# Patient Record
Sex: FEMALE | Race: BLACK OR AFRICAN AMERICAN | NOT HISPANIC OR LATINO | Employment: FULL TIME | URBAN - METROPOLITAN AREA
[De-identification: names, ages, dates, MRNs, and addresses within clinical notes are randomized per-mention and may not be internally consistent; named-entity substitution may affect disease eponyms.]

---

## 2020-02-23 ENCOUNTER — HOSPITAL ENCOUNTER (EMERGENCY)
Facility: HOSPITAL | Age: 25
Discharge: HOME | End: 2020-02-23
Attending: EMERGENCY MEDICINE
Payer: MEDICAID

## 2020-02-23 VITALS
WEIGHT: 160 LBS | OXYGEN SATURATION: 97 % | HEIGHT: 61 IN | TEMPERATURE: 101 F | DIASTOLIC BLOOD PRESSURE: 78 MMHG | HEART RATE: 115 BPM | BODY MASS INDEX: 30.21 KG/M2 | SYSTOLIC BLOOD PRESSURE: 133 MMHG | RESPIRATION RATE: 18 BRPM

## 2020-02-23 DIAGNOSIS — R50.9 FEVER, UNSPECIFIED FEVER CAUSE: ICD-10-CM

## 2020-02-23 DIAGNOSIS — J11.1 INFLUENZA-LIKE ILLNESS: Primary | ICD-10-CM

## 2020-02-23 PROCEDURE — 99284 EMERGENCY DEPT VISIT MOD MDM: CPT | Mod: ER

## 2020-02-23 PROCEDURE — 25000003 PHARM REV CODE 250: Mod: ER | Performed by: EMERGENCY MEDICINE

## 2020-02-23 RX ORDER — CETIRIZINE HYDROCHLORIDE 10 MG/1
10 TABLET ORAL DAILY
Qty: 30 TABLET | Refills: 0 | COMMUNITY
Start: 2020-02-23 | End: 2020-08-30 | Stop reason: CLARIF

## 2020-02-23 RX ORDER — FLUTICASONE PROPIONATE 50 MCG
1 SPRAY, SUSPENSION (ML) NASAL 2 TIMES DAILY PRN
Qty: 15 G | Refills: 0 | Status: SHIPPED | OUTPATIENT
Start: 2020-02-23 | End: 2020-08-30 | Stop reason: CLARIF

## 2020-02-23 RX ORDER — BENZONATATE 100 MG/1
200 CAPSULE ORAL 3 TIMES DAILY PRN
Qty: 20 CAPSULE | Refills: 0 | Status: SHIPPED | OUTPATIENT
Start: 2020-02-23 | End: 2020-03-04

## 2020-02-23 RX ORDER — LOSARTAN POTASSIUM 25 MG/1
25 TABLET ORAL DAILY
COMMUNITY

## 2020-02-23 RX ORDER — ACETAMINOPHEN 500 MG
1000 TABLET ORAL
Status: COMPLETED | OUTPATIENT
Start: 2020-02-23 | End: 2020-02-23

## 2020-02-23 RX ADMIN — ACETAMINOPHEN 1000 MG: 500 TABLET ORAL at 05:02

## 2020-02-23 NOTE — ED PROVIDER NOTES
Encounter Date: 2/23/2020       History     Chief Complaint   Patient presents with    Fever     Patient reports fever, cough, congestion and dizziness x3 days    Cough    Nasal Congestion    Dizziness     Patient currently presents with concerns regarding flulike symptoms.  Symptoms include cough, nasal congestion, fever, and myalgias.  Symptoms reportedly began 3 days ago.  Patient denies vomiting. Patient denies diarrhea.  There has been no relief with over-the-counter medications at home.  Patient denies recent ill contacts          Review of patient's allergies indicates:   Allergen Reactions    Amoxicillin Hives     Past Medical History:   Diagnosis Date    Hypertension      History reviewed. No pertinent surgical history.  History reviewed. No pertinent family history.  Social History     Tobacco Use    Smoking status: Never Smoker    Smokeless tobacco: Never Used   Substance Use Topics    Alcohol use: Never     Frequency: Never    Drug use: Never     Review of Systems   Constitutional: Positive for appetite change and fatigue. Negative for chills and fever.   HENT: Positive for congestion, postnasal drip and rhinorrhea. Negative for sore throat.    Respiratory: Positive for cough. Negative for chest tightness, shortness of breath and wheezing.    Cardiovascular: Negative for chest pain, palpitations and leg swelling.   Gastrointestinal: Negative for abdominal pain, constipation, diarrhea, nausea and vomiting.   Genitourinary: Negative for dysuria, frequency, urgency, vaginal bleeding and vaginal discharge.   Musculoskeletal: Positive for myalgias. Negative for back pain.   Skin: Negative for color change and rash.   Allergic/Immunologic: Negative for immunocompromised state.   Neurological: Negative for dizziness, weakness and numbness.   Hematological: Negative for adenopathy. Does not bruise/bleed easily.   All other systems reviewed and are negative.      Physical Exam     Initial Vitals  [02/23/20 0529]   BP Pulse Resp Temp SpO2   133/78 (!) 115 18 (!) 101.1 °F (38.4 °C) 97 %      MAP       --         Physical Exam    Nursing note and vitals reviewed.  Constitutional: She appears well-developed and well-nourished. She is not diaphoretic. No distress.   HENT:   Head: Normocephalic and atraumatic.   Right Ear: External ear normal.   Left Ear: External ear normal.   Nose: Rhinorrhea present.   Mouth/Throat: Oropharynx is clear and moist.   Eyes: Conjunctivae and EOM are normal. Pupils are equal, round, and reactive to light. No scleral icterus.   Neck: Neck supple. No tracheal deviation present. No JVD present.   Cardiovascular: Normal rate, regular rhythm, normal heart sounds and intact distal pulses. Exam reveals no gallop and no friction rub.    No murmur heard.  Pulmonary/Chest: Breath sounds normal. No respiratory distress. She has no wheezes. She has no rhonchi. She has no rales.   Abdominal: Soft. Bowel sounds are normal. She exhibits no distension. There is no tenderness.   Musculoskeletal: Normal range of motion. She exhibits no edema.   Neurological: She is alert and oriented to person, place, and time. She has normal strength. No cranial nerve deficit or sensory deficit.   Skin: Skin is warm and dry. No rash noted.   Psychiatric: She has a normal mood and affect. Her behavior is normal.         ED Course   Procedures  Labs Reviewed - No data to display       Imaging Results    None          Medical Decision Making:   ED Management:  All findings were reviewed with the patient/family in detail.  I see no indication of an emergent process beyond that addressed during our encounter but have duly counseled the patient/family regarding the need for prompt follow-up as well as the indications that should prompt immediate return to the emergency room should new or worrisome developments occur.  The patient has additionally been provided with printed information regarding diagnosis as well as  instructions regarding follow up and any medications intended to manage the patient's aforementioned conditions.  The patient/family communicates understanding of all this information and all remaining questions and concerns were addressed at this time.                                       Clinical Impression:       ICD-10-CM ICD-9-CM   1. Influenza-like illness R69 799.89   2. Fever, unspecified fever cause R50.9 780.60                             Deep Aviles MD  02/23/20 0546

## 2020-08-30 ENCOUNTER — HOSPITAL ENCOUNTER (EMERGENCY)
Facility: HOSPITAL | Age: 25
Discharge: HOME | End: 2020-08-30
Attending: EMERGENCY MEDICINE
Payer: MEDICAID

## 2020-08-30 VITALS
RESPIRATION RATE: 28 BRPM | HEIGHT: 62 IN | TEMPERATURE: 99 F | HEART RATE: 89 BPM | WEIGHT: 165 LBS | OXYGEN SATURATION: 100 % | BODY MASS INDEX: 30.36 KG/M2 | DIASTOLIC BLOOD PRESSURE: 77 MMHG | SYSTOLIC BLOOD PRESSURE: 130 MMHG

## 2020-08-30 DIAGNOSIS — R53.83 FATIGUE: ICD-10-CM

## 2020-08-30 DIAGNOSIS — R11.0 NAUSEA: Primary | ICD-10-CM

## 2020-08-30 LAB
ALBUMIN SERPL BCP-MCNC: 4.3 G/DL (ref 3.5–5.2)
ALP SERPL-CCNC: 84 U/L (ref 38–126)
ALT SERPL W/O P-5'-P-CCNC: 17 U/L (ref 10–44)
AMPHET+METHAMPHET UR QL: NEGATIVE
ANION GAP SERPL CALC-SCNC: 8 MMOL/L (ref 8–16)
AST SERPL-CCNC: 31 U/L (ref 15–46)
B-HCG UR QL: NEGATIVE
BACTERIA #/AREA URNS AUTO: ABNORMAL /HPF
BARBITURATES UR QL SCN>200 NG/ML: NEGATIVE
BASOPHILS # BLD AUTO: 0.03 K/UL (ref 0–0.2)
BASOPHILS NFR BLD: 0.4 % (ref 0–1.9)
BENZODIAZ UR QL SCN>200 NG/ML: NEGATIVE
BILIRUB SERPL-MCNC: 0.6 MG/DL (ref 0.1–1)
BILIRUB UR QL STRIP: NEGATIVE
BUN SERPL-MCNC: 13 MG/DL (ref 7–17)
BZE UR QL SCN: NEGATIVE
CALCIUM SERPL-MCNC: 8.8 MG/DL (ref 8.7–10.5)
CANNABINOIDS UR QL SCN: NEGATIVE
CHLORIDE SERPL-SCNC: 104 MMOL/L (ref 95–110)
CLARITY UR REFRACT.AUTO: CLEAR
CO2 SERPL-SCNC: 28 MMOL/L (ref 23–29)
COLOR UR AUTO: YELLOW
CREAT SERPL-MCNC: 0.91 MG/DL (ref 0.5–1.4)
CREAT UR-MCNC: 69.5 MG/DL (ref 15–325)
DIFFERENTIAL METHOD: NORMAL
EOSINOPHIL # BLD AUTO: 0.1 K/UL (ref 0–0.5)
EOSINOPHIL NFR BLD: 1.3 % (ref 0–8)
ERYTHROCYTE [DISTWIDTH] IN BLOOD BY AUTOMATED COUNT: 12.3 % (ref 11.5–14.5)
EST. GFR  (AFRICAN AMERICAN): >60 ML/MIN/1.73 M^2
EST. GFR  (NON AFRICAN AMERICAN): >60 ML/MIN/1.73 M^2
GLUCOSE SERPL-MCNC: 97 MG/DL (ref 70–110)
GLUCOSE UR QL STRIP: NEGATIVE
HCT VFR BLD AUTO: 39.9 % (ref 37–48.5)
HGB BLD-MCNC: 13 G/DL (ref 12–16)
HGB UR QL STRIP: NEGATIVE
IMM GRANULOCYTES # BLD AUTO: 0.02 K/UL (ref 0–0.04)
IMM GRANULOCYTES NFR BLD AUTO: 0.3 % (ref 0–0.5)
KETONES UR QL STRIP: NEGATIVE
LEUKOCYTE ESTERASE UR QL STRIP: ABNORMAL
LYMPHOCYTES # BLD AUTO: 2.7 K/UL (ref 1–4.8)
LYMPHOCYTES NFR BLD: 38.2 % (ref 18–48)
MCH RBC QN AUTO: 30.4 PG (ref 27–31)
MCHC RBC AUTO-ENTMCNC: 32.6 G/DL (ref 32–36)
MCV RBC AUTO: 93 FL (ref 82–98)
METHADONE UR QL SCN>300 NG/ML: NEGATIVE
MICROSCOPIC COMMENT: ABNORMAL
MONOCYTES # BLD AUTO: 0.6 K/UL (ref 0.3–1)
MONOCYTES NFR BLD: 8.8 % (ref 4–15)
NEUTROPHILS # BLD AUTO: 3.7 K/UL (ref 1.8–7.7)
NEUTROPHILS NFR BLD: 51 % (ref 38–73)
NITRITE UR QL STRIP: NEGATIVE
NRBC BLD-RTO: 0 /100 WBC
NT-PROBNP: 32 PG/ML (ref 5–450)
OPIATES UR QL SCN: NEGATIVE
PCP UR QL SCN>25 NG/ML: NEGATIVE
PH UR STRIP: 6 [PH] (ref 5–8)
PLATELET # BLD AUTO: 305 K/UL (ref 150–350)
PMV BLD AUTO: 10.5 FL (ref 9.2–12.9)
POTASSIUM SERPL-SCNC: 3.8 MMOL/L (ref 3.5–5.1)
PROT SERPL-MCNC: 7.6 G/DL (ref 6–8.4)
PROT UR QL STRIP: NEGATIVE
RBC # BLD AUTO: 4.28 M/UL (ref 4–5.4)
RBC #/AREA URNS AUTO: 1 /HPF (ref 0–4)
SODIUM SERPL-SCNC: 140 MMOL/L (ref 136–145)
SP GR UR STRIP: 1.01 (ref 1–1.03)
SQUAMOUS #/AREA URNS AUTO: ABNORMAL /HPF
TOXICOLOGY INFORMATION: NORMAL
TROPONIN I SERPL DL<=0.01 NG/ML-MCNC: <0.012 NG/ML (ref 0.01–0.03)
TSH SERPL DL<=0.005 MIU/L-ACNC: 2.63 UIU/ML (ref 0.4–4)
URN SPEC COLLECT METH UR: ABNORMAL
UROBILINOGEN UR STRIP-ACNC: NEGATIVE EU/DL
WBC # BLD AUTO: 7.15 K/UL (ref 3.9–12.7)
WBC #/AREA URNS AUTO: 8 /HPF (ref 0–5)

## 2020-08-30 PROCEDURE — 36000 PLACE NEEDLE IN VEIN: CPT | Mod: ER

## 2020-08-30 PROCEDURE — 93005 ELECTROCARDIOGRAM TRACING: CPT | Mod: ER

## 2020-08-30 PROCEDURE — 81000 URINALYSIS NONAUTO W/SCOPE: CPT | Mod: ER,59

## 2020-08-30 PROCEDURE — U0003 INFECTIOUS AGENT DETECTION BY NUCLEIC ACID (DNA OR RNA); SEVERE ACUTE RESPIRATORY SYNDROME CORONAVIRUS 2 (SARS-COV-2) (CORONAVIRUS DISEASE [COVID-19]), AMPLIFIED PROBE TECHNIQUE, MAKING USE OF HIGH THROUGHPUT TECHNOLOGIES AS DESCRIBED BY CMS-2020-01-R: HCPCS | Mod: ER

## 2020-08-30 PROCEDURE — 93010 ELECTROCARDIOGRAM REPORT: CPT | Mod: ,,, | Performed by: INTERNAL MEDICINE

## 2020-08-30 PROCEDURE — 83880 ASSAY OF NATRIURETIC PEPTIDE: CPT | Mod: ER

## 2020-08-30 PROCEDURE — 99284 EMERGENCY DEPT VISIT MOD MDM: CPT | Mod: 25,ER

## 2020-08-30 PROCEDURE — 84484 ASSAY OF TROPONIN QUANT: CPT | Mod: ER

## 2020-08-30 PROCEDURE — 80053 COMPREHEN METABOLIC PANEL: CPT | Mod: ER

## 2020-08-30 PROCEDURE — 84443 ASSAY THYROID STIM HORMONE: CPT | Mod: ER

## 2020-08-30 PROCEDURE — 25000003 PHARM REV CODE 250: Mod: ER | Performed by: EMERGENCY MEDICINE

## 2020-08-30 PROCEDURE — 93010 EKG 12-LEAD: ICD-10-PCS | Mod: ,,, | Performed by: INTERNAL MEDICINE

## 2020-08-30 PROCEDURE — 80307 DRUG TEST PRSMV CHEM ANLYZR: CPT | Mod: ER

## 2020-08-30 PROCEDURE — 81025 URINE PREGNANCY TEST: CPT | Mod: ER

## 2020-08-30 PROCEDURE — 85025 COMPLETE CBC W/AUTO DIFF WBC: CPT | Mod: ER

## 2020-08-30 RX ORDER — SULFAMETHOXAZOLE AND TRIMETHOPRIM 800; 160 MG/1; MG/1
1 TABLET ORAL 2 TIMES DAILY
Qty: 6 TABLET | Refills: 0 | Status: SHIPPED | OUTPATIENT
Start: 2020-08-30 | End: 2020-09-02

## 2020-08-30 RX ORDER — ONDANSETRON 4 MG/1
4 TABLET, ORALLY DISINTEGRATING ORAL EVERY 8 HOURS PRN
Qty: 9 TABLET | Refills: 0 | Status: SHIPPED | OUTPATIENT
Start: 2020-08-30 | End: 2020-09-02

## 2020-08-30 RX ORDER — ONDANSETRON 4 MG/1
4 TABLET, ORALLY DISINTEGRATING ORAL
Status: COMPLETED | OUTPATIENT
Start: 2020-08-30 | End: 2020-08-30

## 2020-08-30 RX ADMIN — ONDANSETRON 4 MG: 4 TABLET, ORALLY DISINTEGRATING ORAL at 08:08

## 2020-08-30 NOTE — ED PROVIDER NOTES
Encounter Date: 8/30/2020       History     Chief Complaint   Patient presents with    Dizziness     Pt with c/o weakness, dizziness and nausea x3 days.     Weakness    Nausea     Pt is a 26 yo AAF with pmhx of HTN (for which she takes Losartan daily) who presents to the ED with the complaint of lightheadedness (she denies true dizziness or room spinning when questioned), fatigue and nausea x 3 days. Pt denies any sick contacts, changes in appetite, new medication, illicit drug use, blood in stool, chest pain, shortness of breath, fever, chills,  symptoms, abdominal pain, night sweats, unintentional weight loss, significant life events or stressors.         Review of patient's allergies indicates:   Allergen Reactions    Amoxicillin Hives     Past Medical History:   Diagnosis Date    Hypertension      History reviewed. No pertinent surgical history.  History reviewed. No pertinent family history.  Social History     Tobacco Use    Smoking status: Never Smoker    Smokeless tobacco: Never Used   Substance Use Topics    Alcohol use: Never     Frequency: Never    Drug use: Never     Review of Systems   Constitutional: Positive for fatigue. Negative for activity change, appetite change, chills, diaphoresis, fever and unexpected weight change.   HENT: Negative for congestion.    Eyes: Negative for photophobia and visual disturbance.   Respiratory: Negative for chest tightness and shortness of breath.    Cardiovascular: Negative for chest pain, palpitations and leg swelling.   Gastrointestinal: Positive for nausea. Negative for abdominal pain, blood in stool, constipation, diarrhea and vomiting.   Endocrine: Negative for polyphagia and polyuria.   Genitourinary: Negative for dysuria, frequency, menstrual problem, pelvic pain, urgency, vaginal bleeding, vaginal discharge and vaginal pain.   Musculoskeletal: Negative for back pain, joint swelling, myalgias, neck pain and neck stiffness.   Allergic/Immunologic:  Negative for immunocompromised state.   Neurological: Positive for light-headedness. Negative for dizziness, tremors, syncope, facial asymmetry, speech difficulty and headaches.   Psychiatric/Behavioral: Negative for agitation, confusion and decreased concentration.       Physical Exam     Initial Vitals [08/30/20 0708]   BP Pulse Resp Temp SpO2   (!) 158/93 89 18 99 °F (37.2 °C) 100 %      MAP       --         Physical Exam    Nursing note and vitals reviewed.  Constitutional: She appears well-developed and well-nourished.   Well-appearing  No acute distress   HENT:   Head: Normocephalic and atraumatic.   Right Ear: External ear normal.   Left Ear: External ear normal.   Mouth/Throat: No oropharyngeal exudate.   Eyes: EOM are normal. Pupils are equal, round, and reactive to light.   Neck: Normal range of motion. Neck supple. No thyromegaly present. No JVD present.   Cardiovascular: Normal rate, regular rhythm, normal heart sounds and intact distal pulses.   No murmur heard.  Pulmonary/Chest: Breath sounds normal.   Lungs clear to auscultation bilaterally   Abdominal: Soft. Bowel sounds are normal.   Abdomen soft, nontender nondistended; normal bowel sounds all 4 quadrants   Musculoskeletal: Normal range of motion.   Lymphadenopathy:     She has no cervical adenopathy.   Neurological: She is alert and oriented to person, place, and time.   AAOx4  Strength 5/5   Sensation grossly in tact  No neurological deficits  MAEW  GCS 15   Skin: Skin is warm and dry. Capillary refill takes less than 2 seconds. No rash and no abscess noted.   Psychiatric: She has a normal mood and affect.         ED Course   Procedures  Labs Reviewed   URINALYSIS, REFLEX TO URINE CULTURE - Abnormal; Notable for the following components:       Result Value    Leukocytes, UA 2+ (*)     All other components within normal limits    Narrative:     Specimen Source->Urine   URINALYSIS MICROSCOPIC - Abnormal; Notable for the following components:     WBC, UA 8 (*)     Bacteria Moderate (*)     All other components within normal limits    Narrative:     Specimen Source->Urine   TSH   PREGNANCY TEST, URINE RAPID    Narrative:     Specimen Source->Urine   CBC W/ AUTO DIFFERENTIAL   COMPREHENSIVE METABOLIC PANEL   TROPONIN I   NT-PRO NATRIURETIC PEPTIDE   DRUG SCREEN PANEL, URINE EMERGENCY    Narrative:     Specimen Source->Urine   SARS-COV-2 (COVID-19) QUALITATIVE PCR     EKG Readings: (Independently Interpreted)   Initial Reading: No STEMI. Rhythm: Normal Sinus Rhythm. Heart Rate: 75. Ectopy: No Ectopy. Conduction: Normal. ST Segments: Normal ST Segments. Axis: Normal.       Imaging Results    None          Medical Decision Making:   Clinical Tests:   Lab Tests: Ordered and Reviewed  ED Management:  - EKG NSR; no arrhythmia; no findings to suggest ischemia; no STEMI  - CBC w/diff WNL; H/H stable; no significant leukocytosis  - CMP WNL; no significant electrolyte abnormality; renal function WNL   - Troponin WNL   - BNP WNL   - TSH WNL   - UDS WNL  - UA with 2+ leuks; nitrite neg; no ketones; no glucose  - UPT negative   - Routine COVID 19 swab ordered, pending   - VSS throughout ED stay  - Pt stable for discharge  - No further intervention is indicated at this time after having taken into account the patient's history, physical exam findings, and empirical and objective data obtained during the patient's emergency department workup.   - The patient is at low risk for an emergent medical condition at this time, and I am of the belief that that it is safe to discharge the patient from the emergency department.   - The patient is instructed to follow up as outpatient as indicated on the discharge paperwork.    - I have discussed the specifics of the workup with the patient and the patient has verbalized understanding of the details of the workup, the diagnosis, the treatment plan, and the need for outpatient follow-up.    - Although the patient has no emergent  etiology today this does not preclude the development of an emergent condition so, in addition, I have advised the patient that they can return to the ED and/or activate EMS at any time with worsening of their symptoms, change of their symptoms, or with any other medical complaint.    - The patient remained comfortable and stable during their visit in the ED.    - Discharge and follow-up instructions discussed with the patient who expressed understanding and willingness to comply with my recommendations.  - Results of all emergency department tests  discussed thoroughly with patient; all patient questions answered; pt in agreement with plan  - Pt instructed to follow up with PCP in 2-3 days for recheck of today's complaints  - Pt given strict emergency department return precautions for any new or worsening of symptoms  - Pt discharged from the emergency department in stable condition, in no acute distress                     ED Course as of Aug 30 0937   Sun Aug 30, 2020   0904 Leukocytes, UA(!): 2+ [LC]      ED Course User Index  [LC] Wade Arriaga MD                Clinical Impression:       ICD-10-CM ICD-9-CM   1. Nausea  R11.0 787.02   2. Fatigue  R53.83 780.79             ED Disposition Condition    Discharge Stable        ED Prescriptions     Medication Sig Dispense Start Date End Date Auth. Provider    sulfamethoxazole-trimethoprim 800-160mg (BACTRIM DS) 800-160 mg Tab Take 1 tablet by mouth 2 (two) times daily. for 3 days 6 tablet 8/30/2020 9/2/2020 Wade Arriaga MD    ondansetron (ZOFRAN-ODT) 4 MG TbDL Take 1 tablet (4 mg total) by mouth every 8 (eight) hours as needed (nausea). 9 tablet 8/30/2020 9/2/2020 Wade Arriaga MD        Follow-up Information     Follow up With Specialties Details Why Contact Info    [facility]  If symptoms worsen [address] [facility] [facility] [address] [phone]                                     aWde HOLDEN  MD Bart  08/30/20 0937

## 2020-08-31 LAB — SARS-COV-2 RNA RESP QL NAA+PROBE: NOT DETECTED

## 2021-12-21 ENCOUNTER — HOSPITAL ENCOUNTER (EMERGENCY)
Facility: HOSPITAL | Age: 26
Discharge: HOME | End: 2021-12-21
Attending: EMERGENCY MEDICINE
Payer: MEDICAID

## 2021-12-21 VITALS
TEMPERATURE: 98 F | BODY MASS INDEX: 30.18 KG/M2 | HEART RATE: 109 BPM | WEIGHT: 165 LBS | DIASTOLIC BLOOD PRESSURE: 78 MMHG | RESPIRATION RATE: 18 BRPM | OXYGEN SATURATION: 100 % | SYSTOLIC BLOOD PRESSURE: 136 MMHG

## 2021-12-21 DIAGNOSIS — Z20.822 CLOSE EXPOSURE TO COVID-19 VIRUS: Primary | ICD-10-CM

## 2021-12-21 LAB — SARS-COV-2 RDRP RESP QL NAA+PROBE: NEGATIVE

## 2021-12-21 PROCEDURE — U0002 COVID-19 LAB TEST NON-CDC: HCPCS | Mod: ER | Performed by: EMERGENCY MEDICINE

## 2021-12-21 PROCEDURE — 99282 EMERGENCY DEPT VISIT SF MDM: CPT | Mod: ER

## 2021-12-25 ENCOUNTER — HOSPITAL ENCOUNTER (EMERGENCY)
Facility: HOSPITAL | Age: 26
Discharge: HOME | End: 2021-12-25
Attending: EMERGENCY MEDICINE
Payer: MEDICAID

## 2021-12-25 VITALS
DIASTOLIC BLOOD PRESSURE: 93 MMHG | RESPIRATION RATE: 17 BRPM | SYSTOLIC BLOOD PRESSURE: 144 MMHG | HEART RATE: 113 BPM | OXYGEN SATURATION: 100 % | HEIGHT: 62 IN | TEMPERATURE: 99 F | BODY MASS INDEX: 30.36 KG/M2 | WEIGHT: 165 LBS

## 2021-12-25 DIAGNOSIS — U07.1 COVID-19: Primary | ICD-10-CM

## 2021-12-25 LAB
B-HCG UR QL: NEGATIVE
INFLUENZA A, MOLECULAR: NEGATIVE
INFLUENZA B, MOLECULAR: NEGATIVE
SARS-COV-2 RDRP RESP QL NAA+PROBE: POSITIVE
SPECIMEN SOURCE: NORMAL

## 2021-12-25 PROCEDURE — 99283 EMERGENCY DEPT VISIT LOW MDM: CPT | Mod: 25,ER

## 2021-12-25 PROCEDURE — 87502 INFLUENZA DNA AMP PROBE: CPT | Mod: ER | Performed by: EMERGENCY MEDICINE

## 2021-12-25 PROCEDURE — 81025 URINE PREGNANCY TEST: CPT | Mod: ER | Performed by: EMERGENCY MEDICINE

## 2021-12-25 PROCEDURE — U0002 COVID-19 LAB TEST NON-CDC: HCPCS | Mod: ER | Performed by: EMERGENCY MEDICINE

## 2021-12-25 RX ORDER — KETOROLAC TROMETHAMINE 30 MG/ML
15 INJECTION, SOLUTION INTRAMUSCULAR; INTRAVENOUS
Status: DISCONTINUED | OUTPATIENT
Start: 2021-12-25 | End: 2021-12-25 | Stop reason: HOSPADM

## 2021-12-25 RX ORDER — DICLOFENAC SODIUM 75 MG/1
75 TABLET, DELAYED RELEASE ORAL 2 TIMES DAILY
Qty: 60 TABLET | Refills: 0 | Status: SHIPPED | OUTPATIENT
Start: 2021-12-25

## 2021-12-25 NOTE — Clinical Note
"Halle"Emilia Melendez was seen and treated in our emergency department on 12/25/2021.     COVID-19 is present in our communities across the state. There is limited testing for COVID at this time, so not all patients can be tested. In this situation, your employee meets the following criteria:    Halle Melendez has met the criteria for COVID-19 testing and has a POSITIVE result. She can return to work once they are asymptomatic for 72 hours without the use of fever reducing medications AND at least ten days from the first positive result.     If you have any questions or concerns, or if I can be of further assistance, please do not hesitate to contact me.    Sincerely,              RN"

## 2021-12-25 NOTE — Clinical Note
"Halle"Emilia Melendez was seen and treated in our emergency department on 12/25/2021.     COVID-19 is present in our communities across the state. There is limited testing for COVID at this time, so not all patients can be tested. In this situation, your employee meets the following criteria:    Halle Melendez has met the criteria for COVID-19 testing based upon symptoms, travel, and/or potential exposure. The test has been completed and is pending results at this time. During this time the employee is not able to work and should be quarantined per the Centers for Disease Control timelines.     If you have any questions or concerns, or if I can be of further assistance, please do not hesitate to contact me.    Sincerely,              RN"

## 2021-12-25 NOTE — ED PROVIDER NOTES
Encounter Date: 12/25/2021       History     Chief Complaint   Patient presents with    sore throat and headache     Pt states she woke up today with headache, sore throat,body aches and cough. Unknown fever     HPI   26 y.o.    Today with ha, ST, myalgias, cough    Vaccinated w/o booster    During covid pandemic    Review of patient's allergies indicates:   Allergen Reactions    Amoxicillin Hives    Nifedipine Palpitations     Past Medical History:   Diagnosis Date    Hypertension      No past surgical history on file.  No family history on file.  Social History     Tobacco Use    Smoking status: Never Smoker    Smokeless tobacco: Never Used   Substance Use Topics    Alcohol use: Never    Drug use: Never     Review of Systems  All systems were reviewed/examined and were negative except as noted in the HPI.    Physical Exam     Initial Vitals [12/25/21 1046]   BP Pulse Resp Temp SpO2   (!) 144/93 (!) 113 17 98.8 °F (37.1 °C) 100 %      MAP       --         Physical Exam    Limited exam due to known COVID positivity    General: the patient is awake, alert, and in no apparent distress. nontoxic  Head: normocephalic and atraumatic, sclera are clear  Neck: supple without meningismus  Chest:  no respiratory distress  Heart: regular rate and rhythm borderline tachy  Psych conversant  Neuro: awake, alert, moving all extremities      ED Course   Procedures  Labs Reviewed   SARS-COV-2 RNA AMPLIFICATION, QUAL - Abnormal; Notable for the following components:       Result Value    SARS-CoV-2 RNA, Amplification, Qual Positive (*)     All other components within normal limits    Narrative:     Is the patient symptomatic?->Yes  COVID-19 result(s) called and verbal readback obtained from Coreen Walsh RN by CXR 12/25/2021 11:29   INFLUENZA A & B BY MOLECULAR   PREGNANCY TEST, URINE RAPID    Narrative:     Specimen Source->Urine          Imaging Results    None          Medications   ketorolac injection 15 mg (15 mg  Intramuscular Not Given 12/25/21 4957)          Medical Decision Making:    This is an emergent evaluation of a patient presenting to the ED.  Nursing notes were reviewed.  covid psoitive  I personally reviewed and interpreted the laboratory results.    I decided to obtain and review old medical records, which showed: ED visits    Evaluation for Emergency Medical Condition  The patient received a medical screening exam and within a reasonable degree of clinical confidence an emergency medical condition has not been identified.  The patient is instructed on proper follow up and return precautions to the ED.      Andre Nunez MD, JERMAINE                   Clinical Impression:   Final diagnoses:  [U07.1] COVID-19 (Primary)          ED Disposition Condition    Discharge Stable        ED Prescriptions     Medication Sig Dispense Start Date End Date Auth. Provider    diclofenac (VOLTAREN) 75 MG EC tablet Take 1 tablet (75 mg total) by mouth 2 (two) times daily. 60 tablet 12/25/2021  Irineo Nunez MD        Follow-up Information    None       Discharged to home in stable condition, return to ED warnings given, follow up and patient care instructions given.      Andre Nunez MD, JERMAINE, CPE, FACEP  Department of Emergency Medicine       Irineo Nunez MD  12/25/21 9739

## 2023-04-10 ENCOUNTER — HOSPITAL ENCOUNTER (EMERGENCY)
Facility: HOSPITAL | Age: 28
Discharge: HOME | End: 2023-04-10
Attending: EMERGENCY MEDICINE
Payer: MEDICAID

## 2023-04-10 VITALS
SYSTOLIC BLOOD PRESSURE: 146 MMHG | OXYGEN SATURATION: 100 % | WEIGHT: 154 LBS | DIASTOLIC BLOOD PRESSURE: 87 MMHG | BODY MASS INDEX: 28.17 KG/M2 | RESPIRATION RATE: 17 BRPM | TEMPERATURE: 98 F | HEART RATE: 79 BPM

## 2023-04-10 DIAGNOSIS — K64.4 EXTERNAL HEMORRHOID: Primary | ICD-10-CM

## 2023-04-10 LAB
B-HCG UR QL: NEGATIVE
CTP QC/QA: YES

## 2023-04-10 PROCEDURE — 81025 URINE PREGNANCY TEST: CPT | Mod: ER | Performed by: EMERGENCY MEDICINE

## 2023-04-10 PROCEDURE — 99283 EMERGENCY DEPT VISIT LOW MDM: CPT | Mod: ER

## 2023-04-10 RX ORDER — KETOROLAC TROMETHAMINE 30 MG/ML
15 INJECTION, SOLUTION INTRAMUSCULAR; INTRAVENOUS
Status: DISCONTINUED | OUTPATIENT
Start: 2023-04-10 | End: 2023-04-10 | Stop reason: HOSPADM

## 2023-04-10 RX ORDER — HYDROCORTISONE 25 MG/G
CREAM TOPICAL 2 TIMES DAILY
Qty: 20 G | Refills: 0 | Status: SHIPPED | OUTPATIENT
Start: 2023-04-10

## 2023-04-10 RX ORDER — DICLOFENAC SODIUM 75 MG/1
75 TABLET, DELAYED RELEASE ORAL 2 TIMES DAILY
Qty: 60 TABLET | Refills: 0 | Status: SHIPPED | OUTPATIENT
Start: 2023-04-10

## 2023-04-10 NOTE — ED PROVIDER NOTES
Encounter Date: 4/10/2023       History     Chief Complaint   Patient presents with    Hemorrhoids     Reports hemorrhoids x 3 days. States started bleeding today. Reports using witch hazel for pain     HPI  27 y.o.    Co hemorrhoids with some mild bleeding today  Sx x few days  Last had with child  Tried OTC meds    Review of patient's allergies indicates:   Allergen Reactions    Amoxicillin Hives    Nifedipine Palpitations     Past Medical History:   Diagnosis Date    Hypertension      History reviewed. No pertinent surgical history.  History reviewed. No pertinent family history.  Social History     Tobacco Use    Smoking status: Never    Smokeless tobacco: Never   Substance Use Topics    Alcohol use: Never    Drug use: Never     Review of Systems  All systems were reviewed/examined and were negative except as noted in the HPI.    Physical Exam     Initial Vitals [04/10/23 1728]   BP Pulse Resp Temp SpO2   (!) 142/90 86 18 98.2 °F (36.8 °C) 100 %      MAP       --         Physical Exam    General: the patient is awake, alert, and in no apparent distress.  Head: normocephalic and atraumatic, sclera are clear  Neck: supple without meningismus  Chest:  no respiratory distress  Heart: regular rate and rhythm  Ext rectal nt hemorrhoid noted, no active bleeding   ALEKSANDAR deferred   Female chaperone  Extremities: warm and well perfused  Skin: warm and dry  Psych conversant  Neuro: awake, alert, moving all extremities    ED Course   Procedures  Labs Reviewed   POCT URINE PREGNANCY          Imaging Results    None          Medications - No data to display   Medical Decision Making:    This is an emergent evaluation of a patient presenting to the ED.  Nursing notes were reviewed.  Hcg neg  I personally reviewed and interpreted the laboratory results.  I decided to obtain and review old medical records, which showed: well care    Evaluation for Emergency Medical Condition  The patient received a medical screening exam and  within a reasonable degree of clinical confidence an emergency medical condition has not been identified.  The patient is instructed on proper follow up and return precautions to the ED.      Andre Nunez MD, JERMAINE                          Clinical Impression:   Final diagnoses:  [K64.4] External hemorrhoid (Primary)        ED Disposition Condition    Discharge Stable          ED Prescriptions       Medication Sig Dispense Start Date End Date Auth. Provider    hydrocortisone 2.5 % cream Apply topically 2 (two) times daily. 20 g 4/10/2023 -- Irineo Nunez MD    diclofenac (VOLTAREN) 75 MG EC tablet Take 1 tablet (75 mg total) by mouth 2 (two) times daily. 60 tablet 4/10/2023 -- Irineo Nunez MD          Follow-up Information       Follow up With Specialties Details Why Contact Info Additional Information    [facility] Family Medicine Schedule an appointment as soon as possible for a visit   [address], [address]  [facility] [address] [phone] Please park in [address] or [address] and use Anali miller. Take Medical Office Bldg. elevators.          Discharged to home in stable condition, return to ED warnings given, follow up and patient care instructions given.      Andre Nunez MD, JERMAINE, [facility]  Department of Emergency Medicine       Irineo Nunez MD  04/12/23 7569

## 2023-04-10 NOTE — FIRST PROVIDER EVALUATION
Emergency Department TeleTriage Encounter Note      CHIEF COMPLAINT    Chief Complaint   Patient presents with    Hemorrhoids     Reports hemorrhoids x 3 days. States started bleeding today. Reports using witch hazel for pain       VITAL SIGNS   Initial Vitals [04/10/23 1728]   BP Pulse Resp Temp SpO2   (!) 142/90 86 18 98.2 °F (36.8 °C) 100 %      MAP       --            ALLERGIES    Review of patient's allergies indicates:   Allergen Reactions    Amoxicillin Hives    Nifedipine Palpitations       PROVIDER TRIAGE NOTE  This is a teletriage evaluation of a 27 y.o. female presenting to the ED complaining of hemorrhoids. Patient is 4 months post partum. She reports what she thinks is a hemorrhoid for the past 2-3 days. She noticed bright red blood in the toilet. She denies continued bleeding.     Patient is alert and oriented. She speaks in complete sentences. She is sitting upright in the chair in no distress.     Initial orders will be placed and care will be transferred to an alternate provider when patient is roomed for a full evaluation. Any additional orders and the final disposition will be determined by that provider.         ORDERS  Labs Reviewed - No data to display    ED Orders (720h ago, onward)      None              Virtual Visit Note: The provider triage portion of this emergency department evaluation and documentation was performed via [employer], a HIPAA-compliant telemedicine application, in concert with a tele-presenter in the room. A face to face patient evaluation with one of my colleagues will occur once the patient is placed in an emergency department room.      DISCLAIMER: This note was prepared with [employer] voice recognition transcription software. Garbled syntax, mangled pronouns, and other bizarre constructions may be attributed to that software system.